# Patient Record
Sex: FEMALE | Race: BLACK OR AFRICAN AMERICAN | NOT HISPANIC OR LATINO | URBAN - METROPOLITAN AREA
[De-identification: names, ages, dates, MRNs, and addresses within clinical notes are randomized per-mention and may not be internally consistent; named-entity substitution may affect disease eponyms.]

---

## 2017-04-03 ENCOUNTER — INPATIENT (INPATIENT)
Facility: HOSPITAL | Age: 32
LOS: 2 days | Discharge: HOME | End: 2017-04-06
Attending: OBSTETRICS & GYNECOLOGY | Admitting: OBSTETRICS & GYNECOLOGY
Payer: MEDICAID

## 2017-04-03 VITALS
OXYGEN SATURATION: 100 % | RESPIRATION RATE: 18 BRPM | SYSTOLIC BLOOD PRESSURE: 166 MMHG | DIASTOLIC BLOOD PRESSURE: 87 MMHG | HEART RATE: 105 BPM | TEMPERATURE: 99 F

## 2017-04-03 LAB
ALBUMIN SERPL ELPH-MCNC: 3.7 G/DL — SIGNIFICANT CHANGE UP (ref 3.3–5)
ALP SERPL-CCNC: 66 U/L — SIGNIFICANT CHANGE UP (ref 40–120)
ALT FLD-CCNC: 14 U/L — SIGNIFICANT CHANGE UP (ref 4–33)
APPEARANCE UR: CLEAR — SIGNIFICANT CHANGE UP
AST SERPL-CCNC: 18 U/L — SIGNIFICANT CHANGE UP (ref 4–32)
BACTERIA # UR AUTO: SIGNIFICANT CHANGE UP
BASOPHILS # BLD AUTO: 0.02 K/UL — SIGNIFICANT CHANGE UP (ref 0–0.2)
BASOPHILS NFR BLD AUTO: 0.2 % — SIGNIFICANT CHANGE UP (ref 0–2)
BILIRUB SERPL-MCNC: 0.3 MG/DL — SIGNIFICANT CHANGE UP (ref 0.2–1.2)
BILIRUB UR-MCNC: NEGATIVE — SIGNIFICANT CHANGE UP
BLD GP AB SCN SERPL QL: NEGATIVE — SIGNIFICANT CHANGE UP
BLOOD UR QL VISUAL: HIGH
BUN SERPL-MCNC: 24 MG/DL — HIGH (ref 7–23)
CALCIUM SERPL-MCNC: 9.3 MG/DL — SIGNIFICANT CHANGE UP (ref 8.4–10.5)
CHLORIDE SERPL-SCNC: 102 MMOL/L — SIGNIFICANT CHANGE UP (ref 98–107)
CO2 SERPL-SCNC: 23 MMOL/L — SIGNIFICANT CHANGE UP (ref 22–31)
COLOR SPEC: YELLOW — SIGNIFICANT CHANGE UP
CREAT SERPL-MCNC: 1.82 MG/DL — HIGH (ref 0.5–1.3)
EOSINOPHIL # BLD AUTO: 0.18 K/UL — SIGNIFICANT CHANGE UP (ref 0–0.5)
EOSINOPHIL NFR BLD AUTO: 1.8 % — SIGNIFICANT CHANGE UP (ref 0–6)
GLUCOSE SERPL-MCNC: 87 MG/DL — SIGNIFICANT CHANGE UP (ref 70–99)
GLUCOSE UR-MCNC: NEGATIVE — SIGNIFICANT CHANGE UP
HCG SERPL-ACNC: 108.4 MIU/ML — SIGNIFICANT CHANGE UP
HCT VFR BLD CALC: 21.1 % — LOW (ref 34.5–45)
HGB BLD-MCNC: 6.8 G/DL — CRITICAL LOW (ref 11.5–15.5)
IMM GRANULOCYTES NFR BLD AUTO: 0.8 % — SIGNIFICANT CHANGE UP (ref 0–1.5)
KETONES UR-MCNC: NEGATIVE — SIGNIFICANT CHANGE UP
LEUKOCYTE ESTERASE UR-ACNC: HIGH
LYMPHOCYTES # BLD AUTO: 1.65 K/UL — SIGNIFICANT CHANGE UP (ref 1–3.3)
LYMPHOCYTES # BLD AUTO: 16.2 % — SIGNIFICANT CHANGE UP (ref 13–44)
MCHC RBC-ENTMCNC: 28.3 PG — SIGNIFICANT CHANGE UP (ref 27–34)
MCHC RBC-ENTMCNC: 32.2 % — SIGNIFICANT CHANGE UP (ref 32–36)
MCV RBC AUTO: 87.9 FL — SIGNIFICANT CHANGE UP (ref 80–100)
MONOCYTES # BLD AUTO: 0.32 K/UL — SIGNIFICANT CHANGE UP (ref 0–0.9)
MONOCYTES NFR BLD AUTO: 3.1 % — SIGNIFICANT CHANGE UP (ref 2–14)
MUCOUS THREADS # UR AUTO: SIGNIFICANT CHANGE UP
NEUTROPHILS # BLD AUTO: 7.93 K/UL — HIGH (ref 1.8–7.4)
NEUTROPHILS NFR BLD AUTO: 77.9 % — HIGH (ref 43–77)
NITRITE UR-MCNC: NEGATIVE — SIGNIFICANT CHANGE UP
PH UR: 6 — SIGNIFICANT CHANGE UP (ref 4.6–8)
PLATELET # BLD AUTO: 308 K/UL — SIGNIFICANT CHANGE UP (ref 150–400)
PMV BLD: 9.5 FL — SIGNIFICANT CHANGE UP (ref 7–13)
POTASSIUM SERPL-MCNC: 3.6 MMOL/L — SIGNIFICANT CHANGE UP (ref 3.5–5.3)
POTASSIUM SERPL-SCNC: 3.6 MMOL/L — SIGNIFICANT CHANGE UP (ref 3.5–5.3)
PROT SERPL-MCNC: 7.6 G/DL — SIGNIFICANT CHANGE UP (ref 6–8.3)
PROT UR-MCNC: 100 — HIGH
RBC # BLD: 2.4 M/UL — LOW (ref 3.8–5.2)
RBC # FLD: 16.1 % — HIGH (ref 10.3–14.5)
RBC CASTS # UR COMP ASSIST: >50 — HIGH (ref 0–?)
RH IG SCN BLD-IMP: POSITIVE — SIGNIFICANT CHANGE UP
RH IG SCN BLD-IMP: POSITIVE — SIGNIFICANT CHANGE UP
SODIUM SERPL-SCNC: 142 MMOL/L — SIGNIFICANT CHANGE UP (ref 135–145)
SP GR SPEC: 1.02 — SIGNIFICANT CHANGE UP (ref 1–1.03)
SQUAMOUS # UR AUTO: SIGNIFICANT CHANGE UP
UROBILINOGEN FLD QL: NORMAL E.U. — SIGNIFICANT CHANGE UP (ref 0.1–0.2)
WBC # BLD: 10.18 K/UL — SIGNIFICANT CHANGE UP (ref 3.8–10.5)
WBC # FLD AUTO: 10.18 K/UL — SIGNIFICANT CHANGE UP (ref 3.8–10.5)
WBC UR QL: SIGNIFICANT CHANGE UP (ref 0–?)

## 2017-04-03 PROCEDURE — 76830 TRANSVAGINAL US NON-OB: CPT | Mod: 26

## 2017-04-03 RX ORDER — AMLODIPINE BESYLATE 2.5 MG/1
10 TABLET ORAL DAILY
Qty: 0 | Refills: 0 | Status: DISCONTINUED | OUTPATIENT
Start: 2017-04-03 | End: 2017-04-06

## 2017-04-03 RX ORDER — SODIUM CHLORIDE 9 MG/ML
1000 INJECTION INTRAMUSCULAR; INTRAVENOUS; SUBCUTANEOUS ONCE
Qty: 0 | Refills: 0 | Status: COMPLETED | OUTPATIENT
Start: 2017-04-03 | End: 2017-04-03

## 2017-04-03 RX ADMIN — SODIUM CHLORIDE 1000 MILLILITER(S): 9 INJECTION INTRAMUSCULAR; INTRAVENOUS; SUBCUTANEOUS at 19:20

## 2017-04-03 NOTE — ED CDU PROVIDER NOTE - PROGRESS NOTE DETAILS
CDU ROSITA SANCHEZ: pt resting comfortably at bedside, states that she is not bleeding at this time, denies any f/c/n/v/d, chest pain, sob, abdominal pain, urinary symptoms numbness/weakness/tingling, is receiving PRBC's 2 units, was seen by GYN attending, pt will be kept in cdu , NPO, and they will round on her in the AM, and possibly take her fro a D&C. will continue to reassess Pt over at sono, gyn will fu after regarding plan once sono completed. CDU DISCHARGE NOTE - Dr. Quiñones, Attending : Patient had uneventful stay in CDU.   Bleeding has improved.  OB took pt to their own US to eval for RPOC, they have persistent concern for RPOC.  Pt feeling better after transfusion.    VSS:  Lungs CTA b/l, CV: RR , Ext: no pedal edema, Neuro AOx3 , nonfocal.  OB req admission to [facility] for further mgmt and tx of possible RPOC.    Attending Statement: I have personally performed a face to face diagnostic evaluation on this patient. I have reviewed the PA note for the day listed above and agree with the history, exam, and plan of care, except as noted. - TORO TIM EM ATTG.

## 2017-04-03 NOTE — ED PROVIDER NOTE - CARE PLAN
Principal Discharge DX:	Vaginal bleeding Principal Discharge DX:	Vaginal bleeding  Secondary Diagnosis:	Anemia

## 2017-04-03 NOTE — ED ADULT NURSE NOTE - OBJECTIVE STATEMENT
Pt received to intake spot 9. Pt A&Ox3 complaining of vaginal bleeding. Pt states that she had an  in February was recently hospitalized for low hemoglobin. Pt states she was at [facility] and received 3 blood transfusions and was discharged on Thursday. Pt states that she is still having bleeding. Pt appears in no acute distress. IV access obtained, labs drawn and sent. Will continue to monitor.

## 2017-04-03 NOTE — ED CDU PROVIDER NOTE - OBJECTIVE STATEMENT
Pt is 30 y/o female with Pmhx of HTN,  here for eval of vaginal bleeding. Pt had TOP at 19 wks on  (had D&E) , states she didn't start bleeding until 1 month after procedure - was seen admitted at [facility] on 3/27 - stayed 4 days, received transfusion (admits to hx of anemia but has never been transfused before) - 3 units, us was done which showed retained products; pt was discharged after receiving cytotech and advised to f/u in the gyn clinic, but started  bleeding again yesterday - had "gush of blood " with multiple large clots with lower abd cramping. Denies CP, SOB, syncope, palpitations, states that the bleeding has somehow subsided. Pt in cdu for PRBC, gyn at bedside. Pt has not been sexually active since termination.

## 2017-04-03 NOTE — ED ADULT TRIAGE NOTE - CHIEF COMPLAINT QUOTE
Pt states she was admitted in another hospital for heavy vaginal bleeding, received blood transfusion. Pt states she had a shot to stop bleeding, but bleeding hasn't stopped. Pt was discharged on 3/30. Pt states the bleeding is not heavy right now, but she has periods of gushing. Denies weakness/dizziness/SOB/palpitations. C/o cramping

## 2017-04-03 NOTE — ED PROVIDER NOTE - OBJECTIVE STATEMENT
32 yo female, hx of  at [facility]  presents to the ED with bouts of vaginal bleeding. Pt states she had her procedure in feb and the bleeding started 1 month later.  Went to [facility] for the vaginal bleeding at the time, had a sono where they saw some retained products. Was given cytotec and eventually had 3 units blood transfusion which brought her hgb up from a 4 to 7. Was DC home on doxy and metronidazole as well but unsure as to why she is taking them on . As of yesterday pt had a "gush of blood" which has decreased today.  Denies any abdominal pains, nausea, vomiting, fevers, chills, dizziness, weakness, chest pains, shortness of breath, palpitations.  Denies intercourse since January.

## 2017-04-03 NOTE — ED PROVIDER NOTE - PROGRESS NOTE DETAILS
hgb 6.8, Sono/hcg  pending. Gyn will consult on patient in ED prior to sending to CDU.  Pt ok with staying for transfusion hgb 6.8, Sono/hcg  pending. Gyn will consult on patient in ED prior to sending to CDU if labs and sono still pending. Ow will see in CDU   Pt ok with staying for transfusion hgb 6.8, Sono/hcg  pending. Gyn will consult on patient in ED prior to sending to CDU if labs and sono still pending. Ow will see in CDU   Pt ok with staying for transfusion    CDU ok with taking patient while sono pending and gyn consult pending.

## 2017-04-03 NOTE — ED CDU PROVIDER NOTE - ATTENDING CONTRIBUTION TO CARE
CDU Att PN: Samson  I performed a face to face bedside interview with patient regarding history of present illness, review of symptoms and past medical history. I completed an independent physical exam.  I have discussed patient's plan of care with PA.   I agree with note as stated above, having amended the EMR as needed to reflect my findings. I have discussed the assessment and plan of care.  This includes during the time I functioned as the attending physician for this patient.  32yo F with recurrent vaginal bleeding s/p termination, hospitalization x 4d at [place] with transfusion, patient reports having one large heavy episode of vaginal bleeding today, still occasional lightheaded and dyspnea. Patient was treated with Cytotec, no procedures during hospitalization. Has prior h/o anemia, though recalls number before of around 12.8, patient awaiting Gyn consult and u/s report, will require at least 1 unit PRBC  On exam awake & alert, NAD., mmm, lungs CTAB no wheeze no crackle, RRR, abdomen soft NT/ND no rebound no guarding, no edema, no calf tenderness, 2+ pulses b/l, neuro A&Ox3, no focal deficits, gait normal, skin warm and dry no rash

## 2017-04-03 NOTE — ED PROVIDER NOTE - DETAILS:
CLAIRE SMALL MD:   I was available for consultation or to see the patient directly regarding patient care at any point during the interview conducted with patient, regarding history, symptoms as well as physical exam.   I was also available to discuss or assist with the plan of care, review results of any testing and decide on disposition.  I agree with note as stated above as co-signer.

## 2017-04-03 NOTE — ED CDU PROVIDER NOTE - PLAN OF CARE
Follow up with your primary doctor as well as your gyn for a post hospital visit taking all results from the ER to be reviewed.  In addition see your renal specialist and bring copies of your labs to follow. If any recurrent symptoms, worsening, concerning or new signs or symptoms return to the ER

## 2017-04-04 ENCOUNTER — APPOINTMENT (OUTPATIENT)
Dept: ANTEPARTUM | Facility: CLINIC | Age: 32
End: 2017-04-04

## 2017-04-04 ENCOUNTER — ASOB RESULT (OUTPATIENT)
Age: 32
End: 2017-04-04

## 2017-04-04 DIAGNOSIS — N93.9 ABNORMAL UTERINE AND VAGINAL BLEEDING, UNSPECIFIED: ICD-10-CM

## 2017-04-04 PROBLEM — Z00.00 ENCOUNTER FOR PREVENTIVE HEALTH EXAMINATION: Status: ACTIVE | Noted: 2017-04-04

## 2017-04-04 LAB
ALBUMIN SERPL ELPH-MCNC: 3.5 G/DL — SIGNIFICANT CHANGE UP (ref 3.3–5)
ALP SERPL-CCNC: 57 U/L — SIGNIFICANT CHANGE UP (ref 40–120)
ALT FLD-CCNC: 13 U/L — SIGNIFICANT CHANGE UP (ref 4–33)
APTT BLD: 30.7 SEC — SIGNIFICANT CHANGE UP (ref 27.5–37.4)
AST SERPL-CCNC: 28 U/L — SIGNIFICANT CHANGE UP (ref 4–32)
BASOPHILS # BLD AUTO: 0.03 K/UL — SIGNIFICANT CHANGE UP (ref 0–0.2)
BASOPHILS NFR BLD AUTO: 0.4 % — SIGNIFICANT CHANGE UP (ref 0–2)
BILIRUB SERPL-MCNC: 0.6 MG/DL — SIGNIFICANT CHANGE UP (ref 0.2–1.2)
BUN SERPL-MCNC: 23 MG/DL — SIGNIFICANT CHANGE UP (ref 7–23)
BUN SERPL-MCNC: 24 MG/DL — HIGH (ref 7–23)
CALCIUM SERPL-MCNC: 8.8 MG/DL — SIGNIFICANT CHANGE UP (ref 8.4–10.5)
CALCIUM SERPL-MCNC: 8.9 MG/DL — SIGNIFICANT CHANGE UP (ref 8.4–10.5)
CHLORIDE SERPL-SCNC: 100 MMOL/L — SIGNIFICANT CHANGE UP (ref 98–107)
CHLORIDE SERPL-SCNC: 103 MMOL/L — SIGNIFICANT CHANGE UP (ref 98–107)
CHLORIDE UR-SCNC: 82 MMOL/L — SIGNIFICANT CHANGE UP
CO2 SERPL-SCNC: 20 MMOL/L — LOW (ref 22–31)
CO2 SERPL-SCNC: 22 MMOL/L — SIGNIFICANT CHANGE UP (ref 22–31)
CREAT ?TM UR-MCNC: 50.91 MG/DL — SIGNIFICANT CHANGE UP
CREAT SERPL-MCNC: 1.57 MG/DL — HIGH (ref 0.5–1.3)
CREAT SERPL-MCNC: 1.68 MG/DL — HIGH (ref 0.5–1.3)
EOSINOPHIL # BLD AUTO: 0.15 K/UL — SIGNIFICANT CHANGE UP (ref 0–0.5)
EOSINOPHIL NFR BLD AUTO: 2 % — SIGNIFICANT CHANGE UP (ref 0–6)
GLUCOSE SERPL-MCNC: 77 MG/DL — SIGNIFICANT CHANGE UP (ref 70–99)
GLUCOSE SERPL-MCNC: 84 MG/DL — SIGNIFICANT CHANGE UP (ref 70–99)
HCT VFR BLD CALC: 25.1 % — LOW (ref 34.5–45)
HCT VFR BLD CALC: 25.9 % — LOW (ref 34.5–45)
HGB BLD-MCNC: 8.5 G/DL — LOW (ref 11.5–15.5)
HGB BLD-MCNC: 8.9 G/DL — LOW (ref 11.5–15.5)
IMM GRANULOCYTES NFR BLD AUTO: 0.4 % — SIGNIFICANT CHANGE UP (ref 0–1.5)
INR BLD: 1.13 — SIGNIFICANT CHANGE UP (ref 0.88–1.17)
LYMPHOCYTES # BLD AUTO: 1.2 K/UL — SIGNIFICANT CHANGE UP (ref 1–3.3)
LYMPHOCYTES # BLD AUTO: 16 % — SIGNIFICANT CHANGE UP (ref 13–44)
MCHC RBC-ENTMCNC: 29.5 PG — SIGNIFICANT CHANGE UP (ref 27–34)
MCHC RBC-ENTMCNC: 29.9 PG — SIGNIFICANT CHANGE UP (ref 27–34)
MCHC RBC-ENTMCNC: 33.9 % — SIGNIFICANT CHANGE UP (ref 32–36)
MCHC RBC-ENTMCNC: 34.4 % — SIGNIFICANT CHANGE UP (ref 32–36)
MCV RBC AUTO: 86.9 FL — SIGNIFICANT CHANGE UP (ref 80–100)
MCV RBC AUTO: 87.2 FL — SIGNIFICANT CHANGE UP (ref 80–100)
MONOCYTES # BLD AUTO: 0.31 K/UL — SIGNIFICANT CHANGE UP (ref 0–0.9)
MONOCYTES NFR BLD AUTO: 4.1 % — SIGNIFICANT CHANGE UP (ref 2–14)
NEUTROPHILS # BLD AUTO: 5.77 K/UL — SIGNIFICANT CHANGE UP (ref 1.8–7.4)
NEUTROPHILS NFR BLD AUTO: 77.1 % — HIGH (ref 43–77)
PLATELET # BLD AUTO: 271 K/UL — SIGNIFICANT CHANGE UP (ref 150–400)
PLATELET # BLD AUTO: 332 K/UL — SIGNIFICANT CHANGE UP (ref 150–400)
PMV BLD: 10 FL — SIGNIFICANT CHANGE UP (ref 7–13)
PMV BLD: 9.5 FL — SIGNIFICANT CHANGE UP (ref 7–13)
POTASSIUM SERPL-MCNC: 3.7 MMOL/L — SIGNIFICANT CHANGE UP (ref 3.5–5.3)
POTASSIUM SERPL-MCNC: 3.9 MMOL/L — SIGNIFICANT CHANGE UP (ref 3.5–5.3)
POTASSIUM SERPL-SCNC: 3.7 MMOL/L — SIGNIFICANT CHANGE UP (ref 3.5–5.3)
POTASSIUM SERPL-SCNC: 3.9 MMOL/L — SIGNIFICANT CHANGE UP (ref 3.5–5.3)
POTASSIUM UR-SCNC: 16.9 MEQ/L — SIGNIFICANT CHANGE UP
PROT SERPL-MCNC: 7.2 G/DL — SIGNIFICANT CHANGE UP (ref 6–8.3)
PROT UR-MCNC: 67.4 MG/DL — SIGNIFICANT CHANGE UP
PROTHROM AB SERPL-ACNC: 12.7 SEC — SIGNIFICANT CHANGE UP (ref 9.8–13.1)
RBC # BLD: 2.88 M/UL — LOW (ref 3.8–5.2)
RBC # BLD: 2.98 M/UL — LOW (ref 3.8–5.2)
RBC # FLD: 15.5 % — HIGH (ref 10.3–14.5)
RBC # FLD: 15.9 % — HIGH (ref 10.3–14.5)
SODIUM SERPL-SCNC: 138 MMOL/L — SIGNIFICANT CHANGE UP (ref 135–145)
SODIUM SERPL-SCNC: 143 MMOL/L — SIGNIFICANT CHANGE UP (ref 135–145)
SODIUM UR-SCNC: 87 MEQ/L — SIGNIFICANT CHANGE UP
WBC # BLD: 7.49 K/UL — SIGNIFICANT CHANGE UP (ref 3.8–10.5)
WBC # BLD: 7.94 K/UL — SIGNIFICANT CHANGE UP (ref 3.8–10.5)
WBC # FLD AUTO: 7.49 K/UL — SIGNIFICANT CHANGE UP (ref 3.8–10.5)
WBC # FLD AUTO: 7.94 K/UL — SIGNIFICANT CHANGE UP (ref 3.8–10.5)

## 2017-04-04 PROCEDURE — 99251: CPT

## 2017-04-04 PROCEDURE — 99222 1ST HOSP IP/OBS MODERATE 55: CPT | Mod: GC

## 2017-04-04 PROCEDURE — 76770 US EXAM ABDO BACK WALL COMP: CPT | Mod: 26

## 2017-04-04 RX ORDER — SODIUM CHLORIDE 9 MG/ML
1000 INJECTION INTRAMUSCULAR; INTRAVENOUS; SUBCUTANEOUS
Qty: 0 | Refills: 0 | Status: DISCONTINUED | OUTPATIENT
Start: 2017-04-04 | End: 2017-04-06

## 2017-04-04 RX ADMIN — SODIUM CHLORIDE 100 MILLILITER(S): 9 INJECTION INTRAMUSCULAR; INTRAVENOUS; SUBCUTANEOUS at 07:52

## 2017-04-04 RX ADMIN — AMLODIPINE BESYLATE 10 MILLIGRAM(S): 2.5 TABLET ORAL at 07:52

## 2017-04-04 NOTE — H&P ADULT. - ATTENDING COMMENTS
REviewed sonogram and repeated Sono with ATU that showed possibility of AVM. Recommendation was not to do repeat DVC since that could lead to a lot of bleeding with risk of hysterectomy. Discussed with patient the findings and would like to preserve her fertility. Decision is to do UAE and to follow hcgs. As per IR, nephrology consult first since has elevated creatinine. Will probably get UAE tomorrow and will use the gel not the permanent material.

## 2017-04-04 NOTE — H&P ADULT. - GENITOURINARY COMMENTS
SSE: os closed, minimal blood in vault, mild discomfort in midline, 8-9wk size retroverted, no TTP, no CMT

## 2017-04-04 NOTE — H&P ADULT. - HISTORY OF PRESENT ILLNESS
31yoF  LMP unknown s/p D&E 19wks at [facility] in [place] on 17 presenting due to persistent vaginal bleeding since surgery. Pt presented to [facility] on 3/27 with heavy VB and pain. Found to have Hgb 4 with concern for retained POC on ultrasound/CTAP. Pt given cytotec with resultant heavy bleeding, transfused 3uRBC, given Lupron, doxy, and metronidazole. Discharged on 3/31. Pt presenting to [facility] ED on 4/3 with further gush of blood during the day but improved in the ED. Hgb 6.8, denies symptoms of anemia. Pt admitted to CDU for 2uRBC transfusion and further work up. TVUS shows concern for hematometra and possible AVM and possible fibroid. Pt admitted for continued management.     POB: C/S 2009 TIUP 32wks, TOP x2  PMh: HTN, anemia, denies renal concerns  PSH: C/S, dilation and vacuum curettage first trimester, D&E 19wks  NKDA  Med: Norvasc 10mg

## 2017-04-04 NOTE — H&P ADULT. - ASSESSMENT
31yoF  s/p D&E at 19wks with persistent VB and concern for retained POC vs vascular malformation on sono, s/p 2uRBC    1. Vaginal bleeding  - s/p 2uRBC with appropriate rise in Hgb  - NPO  - repeat CBC  - monitor VS  - pad count  - repeat TVUS with ATU for vascular malformation concern  - ? plan for OR    2. elevated creatinine  - IVF hydration  - nephrology consult  - renal sono/urine lytes   - repeat cmp    seen with Dr. Gabriel Goss MD R2

## 2017-04-05 LAB
BUN SERPL-MCNC: 24 MG/DL — HIGH (ref 7–23)
CALCIUM SERPL-MCNC: 8.3 MG/DL — LOW (ref 8.4–10.5)
CHLORIDE SERPL-SCNC: 105 MMOL/L — SIGNIFICANT CHANGE UP (ref 98–107)
CO2 SERPL-SCNC: 22 MMOL/L — SIGNIFICANT CHANGE UP (ref 22–31)
CREAT SERPL-MCNC: 1.59 MG/DL — HIGH (ref 0.5–1.3)
GLUCOSE SERPL-MCNC: 97 MG/DL — SIGNIFICANT CHANGE UP (ref 70–99)
HBV SURFACE AG SER-ACNC: NEGATIVE — SIGNIFICANT CHANGE UP
HCT VFR BLD CALC: 25.2 % — LOW (ref 34.5–45)
HCV RNA SERPL NAA DL=5-ACNC: NOT DETECTED — SIGNIFICANT CHANGE UP
HCV RNA SPEC NAA+PROBE-LOG IU: SIGNIFICANT CHANGE UP LOGIU/ML
HGB BLD-MCNC: 8.4 G/DL — LOW (ref 11.5–15.5)
HIV1 AG SER QL: SIGNIFICANT CHANGE UP
HIV1+2 AB SPEC QL: SIGNIFICANT CHANGE UP
MCHC RBC-ENTMCNC: 29.2 PG — SIGNIFICANT CHANGE UP (ref 27–34)
MCHC RBC-ENTMCNC: 33.3 % — SIGNIFICANT CHANGE UP (ref 32–36)
MCV RBC AUTO: 87.5 FL — SIGNIFICANT CHANGE UP (ref 80–100)
PLATELET # BLD AUTO: 272 K/UL — SIGNIFICANT CHANGE UP (ref 150–400)
PMV BLD: 9.5 FL — SIGNIFICANT CHANGE UP (ref 7–13)
POTASSIUM SERPL-MCNC: 3.4 MMOL/L — LOW (ref 3.5–5.3)
POTASSIUM SERPL-SCNC: 3.4 MMOL/L — LOW (ref 3.5–5.3)
RBC # BLD: 2.88 M/UL — LOW (ref 3.8–5.2)
RBC # FLD: 15.6 % — HIGH (ref 10.3–14.5)
SODIUM SERPL-SCNC: 141 MMOL/L — SIGNIFICANT CHANGE UP (ref 135–145)
T PALLIDUM AB TITR SER: NEGATIVE — SIGNIFICANT CHANGE UP
WBC # BLD: 7.63 K/UL — SIGNIFICANT CHANGE UP (ref 3.8–10.5)
WBC # FLD AUTO: 7.63 K/UL — SIGNIFICANT CHANGE UP (ref 3.8–10.5)

## 2017-04-05 PROCEDURE — 86334 IMMUNOFIX E-PHORESIS SERUM: CPT | Mod: 26

## 2017-04-05 PROCEDURE — 76937 US GUIDE VASCULAR ACCESS: CPT | Mod: 26

## 2017-04-05 PROCEDURE — 37244 VASC EMBOLIZE/OCCLUDE BLEED: CPT

## 2017-04-05 PROCEDURE — 99231 SBSQ HOSP IP/OBS SF/LOW 25: CPT

## 2017-04-05 PROCEDURE — 36247 INS CATH ABD/L-EXT ART 3RD: CPT

## 2017-04-05 RX ORDER — NALOXONE HYDROCHLORIDE 4 MG/.1ML
0.1 SPRAY NASAL
Qty: 0 | Refills: 0 | Status: DISCONTINUED | OUTPATIENT
Start: 2017-04-05 | End: 2017-04-06

## 2017-04-05 RX ORDER — DIPHENHYDRAMINE HCL 50 MG
25 CAPSULE ORAL EVERY 6 HOURS
Qty: 0 | Refills: 0 | Status: DISCONTINUED | OUTPATIENT
Start: 2017-04-05 | End: 2017-04-06

## 2017-04-05 RX ORDER — ONDANSETRON 8 MG/1
4 TABLET, FILM COATED ORAL EVERY 6 HOURS
Qty: 0 | Refills: 0 | Status: DISCONTINUED | OUTPATIENT
Start: 2017-04-05 | End: 2017-04-06

## 2017-04-05 RX ORDER — CIPROFLOXACIN LACTATE 400MG/40ML
400 VIAL (ML) INTRAVENOUS EVERY 12 HOURS
Qty: 0 | Refills: 0 | Status: DISCONTINUED | OUTPATIENT
Start: 2017-04-05 | End: 2017-04-06

## 2017-04-05 RX ORDER — KETOROLAC TROMETHAMINE 30 MG/ML
15 SYRINGE (ML) INJECTION EVERY 6 HOURS
Qty: 0 | Refills: 0 | Status: DISCONTINUED | OUTPATIENT
Start: 2017-04-05 | End: 2017-04-06

## 2017-04-05 RX ORDER — HYDROMORPHONE HYDROCHLORIDE 2 MG/ML
30 INJECTION INTRAMUSCULAR; INTRAVENOUS; SUBCUTANEOUS
Qty: 0 | Refills: 0 | Status: DISCONTINUED | OUTPATIENT
Start: 2017-04-05 | End: 2017-04-06

## 2017-04-05 RX ORDER — HYDROMORPHONE HYDROCHLORIDE 2 MG/ML
0.5 INJECTION INTRAMUSCULAR; INTRAVENOUS; SUBCUTANEOUS
Qty: 0 | Refills: 0 | Status: DISCONTINUED | OUTPATIENT
Start: 2017-04-05 | End: 2017-04-06

## 2017-04-05 RX ADMIN — SODIUM CHLORIDE 100 MILLILITER(S): 9 INJECTION INTRAMUSCULAR; INTRAVENOUS; SUBCUTANEOUS at 05:42

## 2017-04-05 RX ADMIN — AMLODIPINE BESYLATE 10 MILLIGRAM(S): 2.5 TABLET ORAL at 05:42

## 2017-04-05 RX ADMIN — HYDROMORPHONE HYDROCHLORIDE 30 MILLILITER(S): 2 INJECTION INTRAMUSCULAR; INTRAVENOUS; SUBCUTANEOUS at 21:15

## 2017-04-05 RX ADMIN — Medication 101 MILLIGRAM(S): at 19:51

## 2017-04-05 RX ADMIN — HYDROMORPHONE HYDROCHLORIDE 30 MILLILITER(S): 2 INJECTION INTRAMUSCULAR; INTRAVENOUS; SUBCUTANEOUS at 19:30

## 2017-04-05 RX ADMIN — SODIUM CHLORIDE 100 MILLILITER(S): 9 INJECTION INTRAMUSCULAR; INTRAVENOUS; SUBCUTANEOUS at 19:30

## 2017-04-06 ENCOUNTER — TRANSCRIPTION ENCOUNTER (OUTPATIENT)
Age: 32
End: 2017-04-06

## 2017-04-06 VITALS
OXYGEN SATURATION: 100 % | RESPIRATION RATE: 156 BRPM | TEMPERATURE: 98 F | DIASTOLIC BLOOD PRESSURE: 63 MMHG | SYSTOLIC BLOOD PRESSURE: 132 MMHG | HEART RATE: 83 BPM

## 2017-04-06 LAB
ANA PAT FLD IF-IMP: SIGNIFICANT CHANGE UP
ANA TITR SER: SIGNIFICANT CHANGE UP
C-ANCA SER-ACNC: NEGATIVE — SIGNIFICANT CHANGE UP
C3 SERPL-MCNC: 134 MG/DL — SIGNIFICANT CHANGE UP (ref 90–180)
C4 SERPL-MCNC: 39.1 MG/DL — SIGNIFICANT CHANGE UP (ref 10–40)
DSDNA AB SER-ACNC: <12 IU/ML — SIGNIFICANT CHANGE UP
GAS PNL BLDMV: SIGNIFICANT CHANGE UP
GBM IGG SER-ACNC: <0.2 U — SIGNIFICANT CHANGE UP
HCG SERPL-ACNC: 73.18 MIU/ML — SIGNIFICANT CHANGE UP
P-ANCA SER-ACNC: NEGATIVE — SIGNIFICANT CHANGE UP

## 2017-04-06 RX ORDER — ACETAMINOPHEN 500 MG
650 TABLET ORAL EVERY 6 HOURS
Qty: 0 | Refills: 0 | Status: DISCONTINUED | OUTPATIENT
Start: 2017-04-06 | End: 2017-04-06

## 2017-04-06 RX ORDER — IBUPROFEN 200 MG
1 TABLET ORAL
Qty: 0 | Refills: 0 | COMMUNITY

## 2017-04-06 RX ORDER — IBUPROFEN 200 MG
600 TABLET ORAL EVERY 6 HOURS
Qty: 0 | Refills: 0 | Status: DISCONTINUED | OUTPATIENT
Start: 2017-04-06 | End: 2017-04-06

## 2017-04-06 RX ORDER — AMLODIPINE BESYLATE 2.5 MG/1
1 TABLET ORAL
Qty: 0 | Refills: 0 | COMMUNITY

## 2017-04-06 RX ORDER — ACETAMINOPHEN 500 MG
2 TABLET ORAL
Qty: 0 | Refills: 0 | COMMUNITY

## 2017-04-06 RX ORDER — METRONIDAZOLE 500 MG
1 TABLET ORAL
Qty: 0 | Refills: 0 | COMMUNITY

## 2017-04-06 RX ADMIN — Medication 200 MILLIGRAM(S): at 05:35

## 2017-04-06 RX ADMIN — HYDROMORPHONE HYDROCHLORIDE 30 MILLILITER(S): 2 INJECTION INTRAMUSCULAR; INTRAVENOUS; SUBCUTANEOUS at 07:16

## 2017-04-06 NOTE — DISCHARGE NOTE ADULT - CARE PROVIDERS DIRECT ADDRESSES
,DirectAddress_Unknown,[email or web address] ,DirectAddress_Unknown,DirectAddress_Unknown,[email or web address]

## 2017-04-06 NOTE — DISCHARGE NOTE ADULT - PROVIDER TOKENS
FREE:[LAST:[[facility]],PHONE:[phone]],FAX:[(   )    -]] FREE:[LAST:[Nephrology (Renal) Clinic],PHONE:[phone]],FAX:[(   )    -],ADDRESS:[[address]]],FREE:[LAST:[Gynecology Clinic],PHONE:[phone]],FAX:[(   )    -],ADDRESS:[[address]]],TOKEN:'[id number]'

## 2017-04-06 NOTE — DISCHARGE NOTE ADULT - PLAN OF CARE
Recovery Activity as tolerated.  Regular diet.  Please come to clinic for follow up in 2 weeks.  Return to ER for any heavy vaginal bleeding (soaking >1 pad/hr), severe HA, lightheadness, dizziness, pain uncontrolled by pain medications, intractable nausea, vomiting, inability to tolerate food, unable to urinate. Activity as tolerated.  Regular diet.  Do not take ibuprofen, motrin, advil, aleve, naproxen.   Follow up with Gyn Clinic at the [facility] ([phone]) on   Follow up with Renal Clinic at the [facility] ([phone]) on   Follow up with Interventional Radiology, Dr Loyd,   Return to ER  or call Clinic for any heavy vaginal bleeding (soaking >1 pad/hr), severe HA, lightheadness, dizziness, pain uncontrolled by pain medications, intractable nausea, vomiting, inability to tolerate food, unable to urinate. Activity as tolerated.  Regular diet.  Do not take ibuprofen, motrin, advil, aleve, naproxen.   Return to ER  or call Clinic for any heavy vaginal bleeding (soaking >1 pad/hr), severe HA, lightheadedness, dizziness, pain uncontrolled by pain medications, intractable nausea, vomiting, inability to tolerate food, unable to urinate.

## 2017-04-06 NOTE — DISCHARGE NOTE ADULT - CARE PROVIDER_API CALL
[facility],   Phone: [phone]  Fax: (   )    - Nephrology (Renal) Clinic,   [facility]/Oncology [address]  Phone: [phone]  Fax: (   )    -    Gynecology Clinic,   [facility]/Oncology [address]  Phone: [phone]  Fax: (   )    -    Real Loyd), Diagnostic Radiology; [facility]  [address] [place] [address]  Phone: [phone]  Fax: [phone]

## 2017-04-06 NOTE — DISCHARGE NOTE ADULT - PATIENT PORTAL LINK FT
“You can access the Follow[employer] Patient Portal, offered by [facility], by registering with the following website: [email or web address]”

## 2017-04-06 NOTE — DISCHARGE NOTE ADULT - MEDICATION SUMMARY - MEDICATIONS TO TAKE
I will START or STAY ON the medications listed below when I get home from the hospital:    Motrin 600 mg oral tablet  -- 1 tab(s) by mouth every 6 hours  -- Indication: For Pain    Tylenol 325 mg oral tablet  -- 2 tab(s) by mouth every 4 hours  -- Indication: For Pain    Norvasc 10 mg oral tablet  -- 1 tab(s) by mouth once a day  -- Indication: For HTN I will START or STAY ON the medications listed below when I get home from the hospital:    Tylenol 325 mg oral tablet  -- 2 tab(s) by mouth every 4 hours  -- Indication: For Pain    Norvasc 10 mg oral tablet  -- 1 tab(s) by mouth once a day  -- Indication: For HTN

## 2017-04-06 NOTE — DISCHARGE NOTE ADULT - ADDITIONAL INSTRUCTIONS
Follow up with Gyn Clinic at the [facility] ([phone]) on (1 wk for BHCG/New pt appt) and (2 wk for ultrasound)  Follow up with Renal Clinic at the [facility] ([phone]) on (2-3 wk for new pt appt)  Follow up with Interventional Radiology, Dr Real Loyd, his office will contact you and you can call [phone] to confirm an appointment. [facility][phone][facility] in 1 wk for BHCG/New pt appt and 2 wks for ultrasound. ACU will call you and call them to confirm appts.  [facility] ([phone]) in 2-3 wk for new pt appt.  Follow up with Interventional Radiology, Dr Real Loyd, his office will contact you and you can call [phone] to confirm an appointment.

## 2017-04-06 NOTE — DISCHARGE NOTE ADULT - MEDICATION SUMMARY - MEDICATIONS TO STOP TAKING
I will STOP taking the medications listed below when I get home from the hospital:    doxycycline monohydrate 100 mg oral capsule  -- 1 cap(s) by mouth 2 times a day    metroNIDAZOLE 500 mg oral tablet  -- 1 tab(s) by mouth every 8 hours I will STOP taking the medications listed below when I get home from the hospital:  None

## 2017-04-06 NOTE — DISCHARGE NOTE ADULT - INSTRUCTIONS
NONE call md office if having temperature above 101.if having nausea,excruciating pain not relived with medication,incision site swelling or redness or discharge noted

## 2017-04-06 NOTE — DISCHARGE NOTE ADULT - CONDITIONS AT DISCHARGE
vitals stable ,ambulating as tolerated ,tolerating diet ,rt groin dressing dry and intact ,discharge instructions given pt verbalize understanding it

## 2017-04-06 NOTE — DISCHARGE NOTE ADULT - HOSPITAL COURSE
Patient presented to [facility] ED w/ complaint of vaginal bleeding in setting of D&E at outside institution on 2/23.  Per patient she had been admitted at [facility] from 3/27-3/30 for vaginal bleeding and was given 3uPRBC, Lupron, Doxy/Flagyl and cytotec for presumed retained products and then was discharged home.  Patient continued to have heavy bleeding and so presented to [facility] ED.  Found to have Hct 21.1.  Had transfusion of 2uPRBC with increase in HCT to 25.1  TVUS showed increased vascularity in the endometrium with concern for retained POC.  Given patient's story and continued bleeding despite treatment an official ATU sono was obtained to evaluate for an arteriovenous malformation.  ATU sono confirmed a uterine AVM.  IR procedure was performed on 4/5 - uterine artery embolization- without complication.  Patient tolerated the procedure well.  Upon admission patients BUN/Cr noted to be elevated so renal was consulted.  Upon further history taking patient reported family history of kidney problems including a brother who had kidney failure from alports syndrome.  Elevated Bun/Cr attributed to likely intrinsic renal disease.  Renal sono obtained showed evidence of intrinsic renal disease.  Renal labs obtained including HIV, RPR, Hep B, Hep C, complement all returned wnl.  Patient to follow up with nephrologist outpatient.    Patients postoperative IR course was unremarkable.  She remained hemodynamically stable and afebrile throughout.  The patient met all appropriate post operative milestones.  The patient was able to void, tolerated a regular diet, and ambulated on her own.  The patient was discharged afebrile, with stable vital signs, and appropriate pain control. Patient presented to [facility] ED w/ complaint of vaginal bleeding in setting of D&E at outside institution on 2/23.  Per patient she had been admitted at [facility] from 3/27-3/30 for vaginal bleeding and was given 3uPRBC, Lupron, Doxy/Flagyl and cytotec for presumed retained products and then was discharged home.  Patient continued to have heavy bleeding and so presented to [facility] ED.  Found to have Hct 21.1.  Had transfusion of 2uPRBC with increase in HCT to 25.1  TVUS showed increased vascularity in the endometrium with concern for retained POC.  Given patient's story and continued bleeding despite treatment an official ATU sono was obtained to evaluate for an arteriovenous malformation.  ATU sono confirmed a uterine AVM.  IR procedure was performed on 4/5 - uterine artery embolization- without complication.  Patient tolerated the procedure well.  Upon admission patients BUN/Cr noted to be elevated so renal was consulted.  Upon further history taking patient reported family history of kidney problems including a brother who had kidney failure from alports syndrome.  Elevated Bun/Cr attributed to likely intrinsic renal disease.  Renal sono obtained showed evidence of intrinsic renal disease.  Renal labs obtained including HIV, RPR, Hep B, Hep C, complement all returned wnl.  Patient to follow up with nephrologist outpatient.    Patients postoperative IR course was unremarkable.  She remained hemodynamically stable and afebrile throughout.  The patient met all appropriate post operative milestones.  The patient was able to void, tolerated a regular diet, and ambulated on her own.  The patient was discharged afebrile, with stable vital signs, and appropriate pain control on HD#4.

## 2017-04-12 ENCOUNTER — OUTPATIENT (OUTPATIENT)
Dept: OUTPATIENT SERVICES | Facility: HOSPITAL | Age: 32
LOS: 1 days | End: 2017-04-12

## 2017-04-12 ENCOUNTER — APPOINTMENT (OUTPATIENT)
Dept: OBGYN | Facility: HOSPITAL | Age: 32
End: 2017-04-12

## 2017-04-12 ENCOUNTER — LABORATORY RESULT (OUTPATIENT)
Age: 32
End: 2017-04-12

## 2017-04-12 VITALS
HEART RATE: 91 BPM | SYSTOLIC BLOOD PRESSURE: 158 MMHG | WEIGHT: 171.96 LBS | HEIGHT: 67 IN | DIASTOLIC BLOOD PRESSURE: 86 MMHG | BODY MASS INDEX: 26.99 KG/M2

## 2017-04-12 DIAGNOSIS — Z86.79 PERSONAL HISTORY OF OTHER DISEASES OF THE CIRCULATORY SYSTEM: ICD-10-CM

## 2017-04-12 DIAGNOSIS — Z82.49 FAMILY HISTORY OF ISCHEMIC HEART DISEASE AND OTHER DISEASES OF THE CIRCULATORY SYSTEM: ICD-10-CM

## 2017-04-12 LAB — HCG SERPL-ACNC: 15.84 MIU/ML — SIGNIFICANT CHANGE UP

## 2017-04-12 RX ORDER — AMLODIPINE BESYLATE 10 MG/1
10 TABLET ORAL
Refills: 0 | Status: ACTIVE | COMMUNITY

## 2017-04-12 RX ORDER — CHLORHEXIDINE GLUCONATE 4 %
325 (65 FE) LIQUID (ML) TOPICAL
Refills: 0 | Status: ACTIVE | COMMUNITY

## 2017-04-13 ENCOUNTER — APPOINTMENT (OUTPATIENT)
Dept: OBGYN | Facility: HOSPITAL | Age: 32
End: 2017-04-13

## 2017-04-13 DIAGNOSIS — Q27.30 ARTERIOVENOUS MALFORMATION, SITE UNSPECIFIED: ICD-10-CM

## 2017-04-17 PROBLEM — Z86.79 HISTORY OF HYPERTENSION: Status: RESOLVED | Noted: 2017-04-12 | Resolved: 2017-04-17

## 2017-04-17 PROBLEM — Z82.49 FAMILY HISTORY OF HYPERTENSION: Status: ACTIVE | Noted: 2017-04-12

## 2017-04-25 ENCOUNTER — APPOINTMENT (OUTPATIENT)
Dept: INTERVENTIONAL RADIOLOGY/VASCULAR | Facility: CLINIC | Age: 32
End: 2017-04-25

## 2017-04-25 ENCOUNTER — APPOINTMENT (OUTPATIENT)
Dept: OBGYN | Facility: HOSPITAL | Age: 32
End: 2017-04-25

## 2017-04-25 VITALS
WEIGHT: 176 LBS | HEIGHT: 67 IN | RESPIRATION RATE: 18 BRPM | SYSTOLIC BLOOD PRESSURE: 181 MMHG | BODY MASS INDEX: 27.62 KG/M2 | DIASTOLIC BLOOD PRESSURE: 92 MMHG | OXYGEN SATURATION: 100 % | HEART RATE: 98 BPM

## 2017-04-25 DIAGNOSIS — Q27.30 ARTERIOVENOUS MALFORMATION, SITE UNSPECIFIED: ICD-10-CM

## 2017-04-25 DIAGNOSIS — D25.9 LEIOMYOMA OF UTERUS, UNSPECIFIED: ICD-10-CM

## 2017-05-17 ENCOUNTER — APPOINTMENT (OUTPATIENT)
Dept: NEPHROLOGY | Facility: HOSPITAL | Age: 32
End: 2017-05-17

## 2023-08-23 NOTE — DISCHARGE NOTE ADULT - FUNCTIONAL STATUS DATE
Quality 410: Psoriasis Clinical Response To Oral Systemic Or Biologic Mediations: Psoriasis Assessment Tool Documented, Met Specified Benchmark Detail Level: Detailed 06-Apr-2017

## 2025-05-20 NOTE — DISCHARGE NOTE ADULT - CARE PLAN
Principal Discharge DX:	Vaginal bleeding  Goal:	Recovery  Instructions for follow-up, activity and diet:	Activity as tolerated.  Regular diet.  Please come to clinic for follow up in 2 weeks.  Return to ER for any heavy vaginal bleeding (soaking >1 pad/hr), severe HA, lightheadness, dizziness, pain uncontrolled by pain medications, intractable nausea, vomiting, inability to tolerate food, unable to urinate. English Principal Discharge DX:	Vaginal bleeding  Goal:	Recovery  Instructions for follow-up, activity and diet:	Activity as tolerated.  Regular diet.  Do not take ibuprofen, motrin, advil, aleve, naproxen.   Follow up with Gyn Clinic at the [facility] ([phone]) on   Follow up with Renal Clinic at the [facility] ([phone]) on   Follow up with Interventional Radiology, Dr Loyd,   Return to ER  or call Clinic for any heavy vaginal bleeding (soaking >1 pad/hr), severe HA, lightheadness, dizziness, pain uncontrolled by pain medications, intractable nausea, vomiting, inability to tolerate food, unable to urinate. Principal Discharge DX:	Vaginal bleeding  Goal:	Recovery  Instructions for follow-up, activity and diet:	Activity as tolerated.  Regular diet.  Do not take ibuprofen, motrin, advil, aleve, naproxen.   Return to ER  or call Clinic for any heavy vaginal bleeding (soaking >1 pad/hr), severe HA, lightheadedness, dizziness, pain uncontrolled by pain medications, intractable nausea, vomiting, inability to tolerate food, unable to urinate.